# Patient Record
Sex: FEMALE | Race: BLACK OR AFRICAN AMERICAN | Employment: OTHER | URBAN - METROPOLITAN AREA
[De-identification: names, ages, dates, MRNs, and addresses within clinical notes are randomized per-mention and may not be internally consistent; named-entity substitution may affect disease eponyms.]

---

## 2017-04-10 ENCOUNTER — HOSPITAL ENCOUNTER (OUTPATIENT)
Dept: PREADMISSION TESTING | Age: 57
Discharge: HOME | End: 2017-04-10
Payer: SELF-PAY

## 2017-04-10 VITALS
DIASTOLIC BLOOD PRESSURE: 60 MMHG | HEART RATE: 83 BPM | OXYGEN SATURATION: 97 % | TEMPERATURE: 98.4 F | RESPIRATION RATE: 16 BRPM | HEIGHT: 64 IN | SYSTOLIC BLOOD PRESSURE: 122 MMHG | BODY MASS INDEX: 27.64 KG/M2 | WEIGHT: 161.9 LBS

## 2017-04-10 LAB
ANION GAP BLD CALC-SCNC: 9 MMOL/L (ref 5–15)
APTT PPP: 29.6 SEC (ref 22.1–32.5)
ATRIAL RATE: 58 BPM
BASOPHILS # BLD AUTO: 0 K/UL (ref 0–0.1)
BASOPHILS # BLD: 1 % (ref 0–1)
BUN SERPL-MCNC: 15 MG/DL (ref 6–20)
BUN/CREAT SERPL: 17 (ref 12–20)
CALCIUM SERPL-MCNC: 9.3 MG/DL (ref 8.5–10.1)
CALCULATED P AXIS, ECG09: 71 DEGREES
CALCULATED R AXIS, ECG10: 45 DEGREES
CALCULATED T AXIS, ECG11: 57 DEGREES
CHLORIDE SERPL-SCNC: 103 MMOL/L (ref 97–108)
CO2 SERPL-SCNC: 28 MMOL/L (ref 21–32)
CREAT SERPL-MCNC: 0.88 MG/DL (ref 0.55–1.02)
DIAGNOSIS, 93000: NORMAL
EOSINOPHIL # BLD: 0.1 K/UL (ref 0–0.4)
EOSINOPHIL NFR BLD: 2 % (ref 0–7)
ERYTHROCYTE [DISTWIDTH] IN BLOOD BY AUTOMATED COUNT: 12.6 % (ref 11.5–14.5)
GLUCOSE SERPL-MCNC: 82 MG/DL (ref 65–100)
HCT VFR BLD AUTO: 38.3 % (ref 35–47)
HGB BLD-MCNC: 12.1 G/DL (ref 11.5–16)
INR PPP: 1 (ref 0.9–1.1)
LYMPHOCYTES # BLD AUTO: 35 % (ref 12–49)
LYMPHOCYTES # BLD: 1.3 K/UL (ref 0.8–3.5)
MCH RBC QN AUTO: 28.9 PG (ref 26–34)
MCHC RBC AUTO-ENTMCNC: 31.6 G/DL (ref 30–36.5)
MCV RBC AUTO: 91.6 FL (ref 80–99)
MONOCYTES # BLD: 0.3 K/UL (ref 0–1)
MONOCYTES NFR BLD AUTO: 9 % (ref 5–13)
NEUTS SEG # BLD: 2 K/UL (ref 1.8–8)
NEUTS SEG NFR BLD AUTO: 53 % (ref 32–75)
P-R INTERVAL, ECG05: 158 MS
PLATELET # BLD AUTO: 217 K/UL (ref 150–400)
POTASSIUM SERPL-SCNC: 4.8 MMOL/L (ref 3.5–5.1)
PROTHROMBIN TIME: 10.5 SEC (ref 9–11.1)
Q-T INTERVAL, ECG07: 394 MS
QRS DURATION, ECG06: 74 MS
QTC CALCULATION (BEZET), ECG08: 386 MS
RBC # BLD AUTO: 4.18 M/UL (ref 3.8–5.2)
SODIUM SERPL-SCNC: 140 MMOL/L (ref 136–145)
THERAPEUTIC RANGE,PTTT: NORMAL SECS (ref 58–77)
VENTRICULAR RATE, ECG03: 58 BPM
WBC # BLD AUTO: 3.7 K/UL (ref 3.6–11)

## 2017-04-10 PROCEDURE — 85025 COMPLETE CBC W/AUTO DIFF WBC: CPT | Performed by: PLASTIC SURGERY

## 2017-04-10 PROCEDURE — 93005 ELECTROCARDIOGRAM TRACING: CPT

## 2017-04-10 PROCEDURE — 85610 PROTHROMBIN TIME: CPT | Performed by: PLASTIC SURGERY

## 2017-04-10 PROCEDURE — 36415 COLL VENOUS BLD VENIPUNCTURE: CPT | Performed by: PLASTIC SURGERY

## 2017-04-10 PROCEDURE — 85730 THROMBOPLASTIN TIME PARTIAL: CPT | Performed by: PLASTIC SURGERY

## 2017-04-10 PROCEDURE — 80048 BASIC METABOLIC PNL TOTAL CA: CPT | Performed by: PLASTIC SURGERY

## 2017-04-10 RX ORDER — BISMUTH SUBSALICYLATE 262 MG
1 TABLET,CHEWABLE ORAL DAILY
COMMUNITY

## 2017-04-10 RX ORDER — MINERAL OIL
180 ENEMA (ML) RECTAL
COMMUNITY

## 2017-04-10 NOTE — PERIOP NOTES
[facility]  PREOPERATIVE INSTRUCTIONS    Surgery Date:  4/28/2017  Surgery arrival time given by surgeon: NO   If no,[facility] [address] staff will call you between 4 PM- 8 PM the day before surgery with your arrival time. If your surgery is on a Monday, we will call you the preceding Friday. Please call [phone] after 8 PM if you did not receive your arrival time. 1. Please report at the designated time to the 2nd [address] [facility]. Bring your insurance card, photo identification, and any copayment ( if applicable). 2. You must have a responsible adult to drive you home. You need to have a responsible adult to stay with you the first 24 hours after surgery if you are going home the same day of your surgery and you should not drive a car for 24 hours following your surgery. 3. Nothing to eat or drink after midnight the night before surgery. This includes no water, gum, mints, coffee, juice, etc.  Please note special instructions, if applicable, below for medications. 4. MEDICATIONS TO TAKE THE MORNING OF SURGERY WITH A SIP OF WATER: None  5. You MAY take your pain medication the day of surgery with a sip of water. 6. No alcoholic beverages 24 hours before or after your surgery. 7. If you are being admitted to the hospital,please leave personal belongings/luggage in your car until you have an assigned hospital room number. 8. Stop Aspirin and/or any non-steroidal anti-inflammatory drugs (i.e. Ibuprofen, Naproxen, Advil, Aleve) as directed by your prescribing physician. You may take Tylenol. Stop herbal supplements 1 week prior to  surgery. 9. If you are currently taking Plavix, Coumadin,or any other blood-thinning/anticoagulant medication contact your prescribing physician for instructions. 10. Please wear comfortable clothes. Wear your glasses instead of contacts. We ask that all money, jewelry and valuables be left at home. Wear no make up, particularly mascara, the day of surgery.    11.  All body piercings, rings,and jewelry need to be removed and left at home. Please wear your hair loose or down. Please no pony-tails, buns, or any metal hair accessories. If you shower the morning of surgery, please do not apply any lotions, powders, or deodorants afterwards. Do not shave any body area within 24 hours of your surgery. 12. Please follow all instructions to avoid any potential surgical cancellation. 13. Should your physical condition change, (i.e. fever, cold, flu, etc.) please notify your surgeon as soon as possible. 14. It is important to be on time. If a situation occurs where you may be delayed, please call:  [phone]  on the day of surgery. 15. The Preadmission Testing staff can be reached at 21 [phone]. Mirian Livingston 16. Special instructions:   1. Please bring your completed medication sheet with you the day of surgery. Please update any changes in medications. 2. Free  parking  The patient was contacted  in person. She  verbalize  understanding of all instructions does not  need reinforcement.

## 2017-04-27 ENCOUNTER — ANESTHESIA EVENT (OUTPATIENT)
Dept: SURGERY | Age: 57
End: 2017-04-27
Payer: SELF-PAY

## 2017-04-28 ENCOUNTER — ANESTHESIA (OUTPATIENT)
Dept: SURGERY | Age: 57
End: 2017-04-28
Payer: SELF-PAY

## 2017-04-28 ENCOUNTER — HOSPITAL ENCOUNTER (OUTPATIENT)
Age: 57
Setting detail: OBSERVATION
Discharge: HOME | End: 2017-04-29
Attending: PLASTIC SURGERY | Admitting: PLASTIC SURGERY
Payer: SELF-PAY

## 2017-04-28 PROCEDURE — 76060000042 HC ANESTHESIA 5.5 TO 6 HR: Performed by: PLASTIC SURGERY

## 2017-04-28 PROCEDURE — 77030008463 HC STPLR SKN PROX J&J -B: Performed by: PLASTIC SURGERY

## 2017-04-28 PROCEDURE — 77030002996 HC SUT SLK J&J -A: Performed by: PLASTIC SURGERY

## 2017-04-28 PROCEDURE — 77030013629 HC ELECTRD NDL STRY -B: Performed by: PLASTIC SURGERY

## 2017-04-28 PROCEDURE — 74011000258 HC RX REV CODE- 258: Performed by: PLASTIC SURGERY

## 2017-04-28 PROCEDURE — 77030018719 HC DRSG PTCH ANTIMIC J&J -A: Performed by: PLASTIC SURGERY

## 2017-04-28 PROCEDURE — 99218 HC RM OBSERVATION: CPT

## 2017-04-28 PROCEDURE — 77030002966 HC SUT PDS J&J -A: Performed by: PLASTIC SURGERY

## 2017-04-28 PROCEDURE — 77030008467 HC STPLR SKN COVD -B: Performed by: PLASTIC SURGERY

## 2017-04-28 PROCEDURE — 77030034850: Performed by: PLASTIC SURGERY

## 2017-04-28 PROCEDURE — C9290 INJ, BUPIVACAINE LIPOSOME: HCPCS | Performed by: PLASTIC SURGERY

## 2017-04-28 PROCEDURE — 76010000139 HC OR TIME 5.5 TO 6 HR: Performed by: PLASTIC SURGERY

## 2017-04-28 PROCEDURE — 77030002986 HC SUT PROL J&J -A: Performed by: PLASTIC SURGERY

## 2017-04-28 PROCEDURE — 77030002916 HC SUT ETHLN J&J -A: Performed by: PLASTIC SURGERY

## 2017-04-28 PROCEDURE — 77030031139 HC SUT VCRL2 J&J -A: Performed by: PLASTIC SURGERY

## 2017-04-28 PROCEDURE — 76210000016 HC OR PH I REC 1 TO 1.5 HR: Performed by: PLASTIC SURGERY

## 2017-04-28 PROCEDURE — 77030008684 HC TU ET CUF COVD -B: Performed by: ANESTHESIOLOGY

## 2017-04-28 PROCEDURE — 77030018836 HC SOL IRR NACL ICUM -A: Performed by: PLASTIC SURGERY

## 2017-04-28 PROCEDURE — 74011250636 HC RX REV CODE- 250/636: Performed by: ANESTHESIOLOGY

## 2017-04-28 PROCEDURE — 77030020061 HC IV BLD WRMR ADMIN SET 3M -B: Performed by: ANESTHESIOLOGY

## 2017-04-28 PROCEDURE — 77030011244 HC DRN WND HUBLS J&J -B: Performed by: PLASTIC SURGERY

## 2017-04-28 PROCEDURE — 74011250636 HC RX REV CODE- 250/636

## 2017-04-28 PROCEDURE — 77030016570 HC BLNKT BAIR HGGR 3M -B: Performed by: PLASTIC SURGERY

## 2017-04-28 PROCEDURE — 77030019908 HC STETH ESOPH SIMS -A: Performed by: ANESTHESIOLOGY

## 2017-04-28 PROCEDURE — 77030010507 HC ADH SKN DERMBND J&J -B: Performed by: PLASTIC SURGERY

## 2017-04-28 PROCEDURE — 77030013567 HC DRN WND RESERV BARD -A: Performed by: PLASTIC SURGERY

## 2017-04-28 PROCEDURE — 77030032490 HC SLV COMPR SCD KNE COVD -B: Performed by: PLASTIC SURGERY

## 2017-04-28 PROCEDURE — 77030034479 HC ADH SKN CLSR PRINEO J&J -B: Performed by: PLASTIC SURGERY

## 2017-04-28 PROCEDURE — 77030020782 HC GWN BAIR PAWS FLX 3M -B

## 2017-04-28 PROCEDURE — 77030033138 HC SUT PGA STRATFX J&J -B: Performed by: PLASTIC SURGERY

## 2017-04-28 PROCEDURE — 77030008534 HC TBNG LIPOSUC BYRO -B: Performed by: PLASTIC SURGERY

## 2017-04-28 PROCEDURE — 74011250637 HC RX REV CODE- 250/637: Performed by: PLASTIC SURGERY

## 2017-04-28 PROCEDURE — 77030010514 HC APPL CLP LIG COVD -B: Performed by: PLASTIC SURGERY

## 2017-04-28 PROCEDURE — 77030008538 HC TBNG LIPO SUC WELL -B: Performed by: PLASTIC SURGERY

## 2017-04-28 PROCEDURE — 74011000250 HC RX REV CODE- 250

## 2017-04-28 PROCEDURE — 74011000250 HC RX REV CODE- 250: Performed by: PLASTIC SURGERY

## 2017-04-28 PROCEDURE — 74011250636 HC RX REV CODE- 250/636: Performed by: PLASTIC SURGERY

## 2017-04-28 PROCEDURE — 77030002933 HC SUT MCRYL J&J -A: Performed by: PLASTIC SURGERY

## 2017-04-28 PROCEDURE — 77030020255 HC SOL INJ LR 1000ML BG: Performed by: PLASTIC SURGERY

## 2017-04-28 PROCEDURE — 74011000272 HC RX REV CODE- 272: Performed by: PLASTIC SURGERY

## 2017-04-28 PROCEDURE — 77030026438 HC STYL ET INTUB CARD -A: Performed by: ANESTHESIOLOGY

## 2017-04-28 RX ORDER — ROCURONIUM BROMIDE 10 MG/ML
INJECTION, SOLUTION INTRAVENOUS AS NEEDED
Status: DISCONTINUED | OUTPATIENT
Start: 2017-04-28 | End: 2017-04-28 | Stop reason: HOSPADM

## 2017-04-28 RX ORDER — SODIUM CHLORIDE, SODIUM LACTATE, POTASSIUM CHLORIDE, CALCIUM CHLORIDE 600; 310; 30; 20 MG/100ML; MG/100ML; MG/100ML; MG/100ML
100 INJECTION, SOLUTION INTRAVENOUS CONTINUOUS
Status: DISCONTINUED | OUTPATIENT
Start: 2017-04-28 | End: 2017-04-28 | Stop reason: HOSPADM

## 2017-04-28 RX ORDER — HYDROMORPHONE HYDROCHLORIDE 2 MG/1
2 TABLET ORAL
Status: DISCONTINUED | OUTPATIENT
Start: 2017-04-28 | End: 2017-04-29 | Stop reason: HOSPADM

## 2017-04-28 RX ORDER — ONDANSETRON 2 MG/ML
INJECTION INTRAMUSCULAR; INTRAVENOUS AS NEEDED
Status: DISCONTINUED | OUTPATIENT
Start: 2017-04-28 | End: 2017-04-28 | Stop reason: HOSPADM

## 2017-04-28 RX ORDER — MIDAZOLAM HYDROCHLORIDE 1 MG/ML
1 INJECTION, SOLUTION INTRAMUSCULAR; INTRAVENOUS AS NEEDED
Status: DISCONTINUED | OUTPATIENT
Start: 2017-04-28 | End: 2017-04-28 | Stop reason: HOSPADM

## 2017-04-28 RX ORDER — PHENYLEPHRINE HCL IN 0.9% NACL 0.4MG/10ML
SYRINGE (ML) INTRAVENOUS AS NEEDED
Status: DISCONTINUED | OUTPATIENT
Start: 2017-04-28 | End: 2017-04-28 | Stop reason: HOSPADM

## 2017-04-28 RX ORDER — CEFAZOLIN SODIUM IN 0.9 % NACL 2 G/50 ML
2 INTRAVENOUS SOLUTION, PIGGYBACK (ML) INTRAVENOUS EVERY 8 HOURS
Status: COMPLETED | OUTPATIENT
Start: 2017-04-28 | End: 2017-04-29

## 2017-04-28 RX ORDER — DIPHENHYDRAMINE HCL 25 MG
25 CAPSULE ORAL
Status: DISCONTINUED | OUTPATIENT
Start: 2017-04-28 | End: 2017-04-29 | Stop reason: HOSPADM

## 2017-04-28 RX ORDER — SODIUM CHLORIDE, SODIUM LACTATE, POTASSIUM CHLORIDE, CALCIUM CHLORIDE 600; 310; 30; 20 MG/100ML; MG/100ML; MG/100ML; MG/100ML
150 INJECTION, SOLUTION INTRAVENOUS CONTINUOUS
Status: DISCONTINUED | OUTPATIENT
Start: 2017-04-28 | End: 2017-04-28 | Stop reason: HOSPADM

## 2017-04-28 RX ORDER — ACETAMINOPHEN 325 MG/1
650 TABLET ORAL
Status: DISCONTINUED | OUTPATIENT
Start: 2017-04-28 | End: 2017-04-29 | Stop reason: HOSPADM

## 2017-04-28 RX ORDER — ONDANSETRON 2 MG/ML
4 INJECTION INTRAMUSCULAR; INTRAVENOUS AS NEEDED
Status: DISCONTINUED | OUTPATIENT
Start: 2017-04-28 | End: 2017-04-28 | Stop reason: HOSPADM

## 2017-04-28 RX ORDER — NALOXONE HYDROCHLORIDE 0.4 MG/ML
0.4 INJECTION, SOLUTION INTRAMUSCULAR; INTRAVENOUS; SUBCUTANEOUS AS NEEDED
Status: DISCONTINUED | OUTPATIENT
Start: 2017-04-28 | End: 2017-04-29 | Stop reason: HOSPADM

## 2017-04-28 RX ORDER — ENOXAPARIN SODIUM 100 MG/ML
40 INJECTION SUBCUTANEOUS EVERY 24 HOURS
Status: DISCONTINUED | OUTPATIENT
Start: 2017-04-29 | End: 2017-04-29 | Stop reason: HOSPADM

## 2017-04-28 RX ORDER — FENTANYL CITRATE 50 UG/ML
50 INJECTION, SOLUTION INTRAMUSCULAR; INTRAVENOUS AS NEEDED
Status: DISCONTINUED | OUTPATIENT
Start: 2017-04-28 | End: 2017-04-28 | Stop reason: HOSPADM

## 2017-04-28 RX ORDER — HYDROMORPHONE HYDROCHLORIDE 2 MG/ML
2 INJECTION, SOLUTION INTRAMUSCULAR; INTRAVENOUS; SUBCUTANEOUS
Status: DISCONTINUED | OUTPATIENT
Start: 2017-04-28 | End: 2017-04-29 | Stop reason: HOSPADM

## 2017-04-28 RX ORDER — FAMOTIDINE 10 MG/ML
INJECTION INTRAVENOUS AS NEEDED
Status: DISCONTINUED | OUTPATIENT
Start: 2017-04-28 | End: 2017-04-28 | Stop reason: HOSPADM

## 2017-04-28 RX ORDER — CEFAZOLIN SODIUM IN 0.9 % NACL 2 G/50 ML
2 INTRAVENOUS SOLUTION, PIGGYBACK (ML) INTRAVENOUS ONCE
Status: COMPLETED | OUTPATIENT
Start: 2017-04-28 | End: 2017-04-28

## 2017-04-28 RX ORDER — DEXTROSE, SODIUM CHLORIDE, AND POTASSIUM CHLORIDE 5; .45; .15 G/100ML; G/100ML; G/100ML
100 INJECTION INTRAVENOUS CONTINUOUS
Status: DISCONTINUED | OUTPATIENT
Start: 2017-04-28 | End: 2017-04-29 | Stop reason: HOSPADM

## 2017-04-28 RX ORDER — DOCUSATE SODIUM 100 MG/1
100 CAPSULE, LIQUID FILLED ORAL 2 TIMES DAILY
Status: DISCONTINUED | OUTPATIENT
Start: 2017-04-28 | End: 2017-04-29 | Stop reason: HOSPADM

## 2017-04-28 RX ORDER — DIAZEPAM 5 MG/1
5 TABLET ORAL
Status: DISCONTINUED | OUTPATIENT
Start: 2017-04-28 | End: 2017-04-29 | Stop reason: HOSPADM

## 2017-04-28 RX ORDER — NEOSTIGMINE METHYLSULFATE 1 MG/ML
INJECTION INTRAVENOUS AS NEEDED
Status: DISCONTINUED | OUTPATIENT
Start: 2017-04-28 | End: 2017-04-28 | Stop reason: HOSPADM

## 2017-04-28 RX ORDER — MIDAZOLAM HYDROCHLORIDE 1 MG/ML
INJECTION, SOLUTION INTRAMUSCULAR; INTRAVENOUS AS NEEDED
Status: DISCONTINUED | OUTPATIENT
Start: 2017-04-28 | End: 2017-04-28 | Stop reason: HOSPADM

## 2017-04-28 RX ORDER — LIDOCAINE HYDROCHLORIDE 20 MG/ML
INJECTION, SOLUTION EPIDURAL; INFILTRATION; INTRACAUDAL; PERINEURAL AS NEEDED
Status: DISCONTINUED | OUTPATIENT
Start: 2017-04-28 | End: 2017-04-28 | Stop reason: HOSPADM

## 2017-04-28 RX ORDER — DIPHENHYDRAMINE HYDROCHLORIDE 50 MG/ML
12.5 INJECTION, SOLUTION INTRAMUSCULAR; INTRAVENOUS AS NEEDED
Status: DISCONTINUED | OUTPATIENT
Start: 2017-04-28 | End: 2017-04-28 | Stop reason: HOSPADM

## 2017-04-28 RX ORDER — ZOLPIDEM TARTRATE 5 MG/1
5 TABLET ORAL
Status: DISCONTINUED | OUTPATIENT
Start: 2017-04-28 | End: 2017-04-29 | Stop reason: HOSPADM

## 2017-04-28 RX ORDER — SODIUM CHLORIDE 0.9 % (FLUSH) 0.9 %
5-10 SYRINGE (ML) INJECTION EVERY 8 HOURS
Status: DISCONTINUED | OUTPATIENT
Start: 2017-04-28 | End: 2017-04-29 | Stop reason: HOSPADM

## 2017-04-28 RX ORDER — PROPOFOL 10 MG/ML
INJECTION, EMULSION INTRAVENOUS AS NEEDED
Status: DISCONTINUED | OUTPATIENT
Start: 2017-04-28 | End: 2017-04-28 | Stop reason: HOSPADM

## 2017-04-28 RX ORDER — LIDOCAINE HYDROCHLORIDE 10 MG/ML
0.1 INJECTION, SOLUTION EPIDURAL; INFILTRATION; INTRACAUDAL; PERINEURAL AS NEEDED
Status: DISCONTINUED | OUTPATIENT
Start: 2017-04-28 | End: 2017-04-28 | Stop reason: HOSPADM

## 2017-04-28 RX ORDER — ALBUTEROL SULFATE 0.83 MG/ML
2.5 SOLUTION RESPIRATORY (INHALATION) AS NEEDED
Status: DISCONTINUED | OUTPATIENT
Start: 2017-04-28 | End: 2017-04-28 | Stop reason: HOSPADM

## 2017-04-28 RX ORDER — SODIUM CHLORIDE 0.9 % (FLUSH) 0.9 %
5-10 SYRINGE (ML) INJECTION AS NEEDED
Status: DISCONTINUED | OUTPATIENT
Start: 2017-04-28 | End: 2017-04-29 | Stop reason: HOSPADM

## 2017-04-28 RX ORDER — FENTANYL CITRATE 50 UG/ML
INJECTION, SOLUTION INTRAMUSCULAR; INTRAVENOUS AS NEEDED
Status: DISCONTINUED | OUTPATIENT
Start: 2017-04-28 | End: 2017-04-28 | Stop reason: HOSPADM

## 2017-04-28 RX ORDER — HYDROMORPHONE HYDROCHLORIDE 2 MG/1
2 TABLET ORAL
COMMUNITY

## 2017-04-28 RX ORDER — HYDROMORPHONE HYDROCHLORIDE 2 MG/ML
INJECTION, SOLUTION INTRAMUSCULAR; INTRAVENOUS; SUBCUTANEOUS AS NEEDED
Status: DISCONTINUED | OUTPATIENT
Start: 2017-04-28 | End: 2017-04-28 | Stop reason: HOSPADM

## 2017-04-28 RX ORDER — GLYCOPYRROLATE 0.2 MG/ML
INJECTION INTRAMUSCULAR; INTRAVENOUS AS NEEDED
Status: DISCONTINUED | OUTPATIENT
Start: 2017-04-28 | End: 2017-04-28 | Stop reason: HOSPADM

## 2017-04-28 RX ORDER — ONDANSETRON 2 MG/ML
4 INJECTION INTRAMUSCULAR; INTRAVENOUS
Status: DISCONTINUED | OUTPATIENT
Start: 2017-04-28 | End: 2017-04-29 | Stop reason: HOSPADM

## 2017-04-28 RX ORDER — LORAZEPAM 2 MG/ML
1 INJECTION INTRAMUSCULAR
Status: DISCONTINUED | OUTPATIENT
Start: 2017-04-28 | End: 2017-04-29 | Stop reason: HOSPADM

## 2017-04-28 RX ORDER — DEXAMETHASONE SODIUM PHOSPHATE 4 MG/ML
INJECTION, SOLUTION INTRA-ARTICULAR; INTRALESIONAL; INTRAMUSCULAR; INTRAVENOUS; SOFT TISSUE AS NEEDED
Status: DISCONTINUED | OUTPATIENT
Start: 2017-04-28 | End: 2017-04-28 | Stop reason: HOSPADM

## 2017-04-28 RX ORDER — SUCCINYLCHOLINE CHLORIDE 20 MG/ML
INJECTION INTRAMUSCULAR; INTRAVENOUS AS NEEDED
Status: DISCONTINUED | OUTPATIENT
Start: 2017-04-28 | End: 2017-04-28 | Stop reason: HOSPADM

## 2017-04-28 RX ORDER — DIAZEPAM 5 MG/1
5 TABLET ORAL
COMMUNITY

## 2017-04-28 RX ORDER — HYDROMORPHONE HYDROCHLORIDE 1 MG/ML
1 INJECTION, SOLUTION INTRAMUSCULAR; INTRAVENOUS; SUBCUTANEOUS
Status: DISCONTINUED | OUTPATIENT
Start: 2017-04-28 | End: 2017-04-28 | Stop reason: HOSPADM

## 2017-04-28 RX ORDER — DIAZEPAM 10 MG/1
5 TABLET ORAL
Status: ON HOLD | COMMUNITY
End: 2017-04-28

## 2017-04-28 RX ADMIN — GLYCOPYRROLATE 0.6 MG: 0.2 INJECTION INTRAMUSCULAR; INTRAVENOUS at 13:42

## 2017-04-28 RX ADMIN — HYDROMORPHONE HYDROCHLORIDE 0.5 MG: 2 INJECTION, SOLUTION INTRAMUSCULAR; INTRAVENOUS; SUBCUTANEOUS at 10:55

## 2017-04-28 RX ADMIN — Medication 10 ML: at 16:20

## 2017-04-28 RX ADMIN — LIDOCAINE HYDROCHLORIDE 60 MG: 20 INJECTION, SOLUTION EPIDURAL; INFILTRATION; INTRACAUDAL; PERINEURAL at 08:19

## 2017-04-28 RX ADMIN — Medication 10 ML: at 19:46

## 2017-04-28 RX ADMIN — DOCUSATE SODIUM 100 MG: 100 CAPSULE ORAL at 16:20

## 2017-04-28 RX ADMIN — PROPOFOL 200 MG: 10 INJECTION, EMULSION INTRAVENOUS at 08:20

## 2017-04-28 RX ADMIN — DEXAMETHASONE SODIUM PHOSPHATE 8 MG: 4 INJECTION, SOLUTION INTRA-ARTICULAR; INTRALESIONAL; INTRAMUSCULAR; INTRAVENOUS; SOFT TISSUE at 08:28

## 2017-04-28 RX ADMIN — NEOSTIGMINE METHYLSULFATE 3 MG: 1 INJECTION INTRAVENOUS at 13:42

## 2017-04-28 RX ADMIN — ROCURONIUM BROMIDE 5 MG: 10 INJECTION, SOLUTION INTRAVENOUS at 12:38

## 2017-04-28 RX ADMIN — SUCCINYLCHOLINE CHLORIDE 100 MG: 20 INJECTION INTRAMUSCULAR; INTRAVENOUS at 08:21

## 2017-04-28 RX ADMIN — HYDROMORPHONE HYDROCHLORIDE 2 MG: 2 INJECTION INTRAMUSCULAR; INTRAVENOUS; SUBCUTANEOUS at 19:45

## 2017-04-28 RX ADMIN — FENTANYL CITRATE 100 MCG: 50 INJECTION, SOLUTION INTRAMUSCULAR; INTRAVENOUS at 08:19

## 2017-04-28 RX ADMIN — CEFAZOLIN 2 G: 1 INJECTION, POWDER, FOR SOLUTION INTRAMUSCULAR; INTRAVENOUS; PARENTERAL at 08:14

## 2017-04-28 RX ADMIN — HYDROMORPHONE HYDROCHLORIDE 0.5 MG: 2 INJECTION, SOLUTION INTRAMUSCULAR; INTRAVENOUS; SUBCUTANEOUS at 09:06

## 2017-04-28 RX ADMIN — SODIUM CHLORIDE, SODIUM LACTATE, POTASSIUM CHLORIDE, AND CALCIUM CHLORIDE 150 ML/HR: 600; 310; 30; 20 INJECTION, SOLUTION INTRAVENOUS at 06:36

## 2017-04-28 RX ADMIN — Medication 80 MCG: at 12:49

## 2017-04-28 RX ADMIN — DEXTROSE MONOHYDRATE, SODIUM CHLORIDE, AND POTASSIUM CHLORIDE 100 ML/HR: 50; 4.5; 1.49 INJECTION, SOLUTION INTRAVENOUS at 14:34

## 2017-04-28 RX ADMIN — HYDROMORPHONE HYDROCHLORIDE 2 MG: 2 TABLET ORAL at 23:43

## 2017-04-28 RX ADMIN — CEFAZOLIN 2 G: 1 INJECTION, POWDER, FOR SOLUTION INTRAMUSCULAR; INTRAVENOUS; PARENTERAL at 16:19

## 2017-04-28 RX ADMIN — ROCURONIUM BROMIDE 15 MG: 10 INJECTION, SOLUTION INTRAVENOUS at 10:39

## 2017-04-28 RX ADMIN — ONDANSETRON 4 MG: 2 INJECTION INTRAMUSCULAR; INTRAVENOUS at 08:28

## 2017-04-28 RX ADMIN — ONDANSETRON 4 MG: 2 INJECTION INTRAMUSCULAR; INTRAVENOUS at 12:52

## 2017-04-28 RX ADMIN — ROCURONIUM BROMIDE 5 MG: 10 INJECTION, SOLUTION INTRAVENOUS at 08:20

## 2017-04-28 RX ADMIN — MIDAZOLAM HYDROCHLORIDE 2 MG: 1 INJECTION, SOLUTION INTRAMUSCULAR; INTRAVENOUS at 08:13

## 2017-04-28 RX ADMIN — FAMOTIDINE 20 MG: 10 INJECTION INTRAVENOUS at 08:13

## 2017-04-28 RX ADMIN — CEFAZOLIN 2 G: 1 INJECTION, POWDER, FOR SOLUTION INTRAMUSCULAR; INTRAVENOUS; PARENTERAL at 23:41

## 2017-04-28 RX ADMIN — ROCURONIUM BROMIDE 40 MG: 10 INJECTION, SOLUTION INTRAVENOUS at 08:28

## 2017-04-28 NOTE — PERIOP NOTES
2365 - Patient family updated on surgical progress and patient well being. 1207 - Patient family updated on surgical progress and patient well being.

## 2017-04-28 NOTE — H&P
History and Physical    Patient is a 64 y.o. well-developed, well nourished female who presents for revision of abdominal scar and abdominoplasty with flank liposuction. History reviewed. No pertinent past medical history. Past Surgical History:   Procedure Laterality Date    HX GYN  1977    ovarian cyst     HX HEENT      dental surgery (implants) [phone]    HX ORTHOPAEDIC      ORIF left ankle with plates    HX TUBAL LIGATION  1993     Prior to Admission medications    Medication Sig Start Date End Date Taking? Authorizing Provider   HYDROmorphone (DILAUDID) 2 mg tablet Take 2 mg by mouth every four (4) hours as needed for Pain. Yes Historical Provider   diazePAM (VALIUM) 5 mg tablet Take 5 mg by mouth every six (6) hours as needed for Anxiety. Yes Historical Provider   multivitamin (ONE A DAY) tablet Take 1 Tab by mouth daily. Historical Provider   fexofenadine (ALLEGRA) 180 mg tablet Take 180 mg by mouth daily as needed for Allergies. Historical Provider     No Known Allergies    General:   No apparent distress. Heart:  Regular rate and rhythm without murmurs or rubs. Lungs:  Clear to ausculation bilaterally; no wheezes, rales or rhonchi. Patient is ready to go to surgery.     Kalpana Uribe MD  4/28/2017

## 2017-04-28 NOTE — PERIOP NOTES
Pt reports taking valium (unknow dose) and another rx for pain/muscle relaxant (unknown name) this am 0400 with a small sip of water. Pt reports  Dr Heike Og prescribes both of these  Medications.

## 2017-04-28 NOTE — ANESTHESIA POSTPROCEDURE EVALUATION
Post-Anesthesia Evaluation and Assessment    Patient: Akash Kim MRN: [id number]  SSN: [id number]    YOB: 1960  Age: 64 y.o. Sex: female       Cardiovascular Function/Vital Signs  Visit Vitals    /63    Pulse 89    Temp 36.7 °C (98.1 °F)    Resp 20    Ht 5' 4\" (1.626 m)    Wt 72.6 kg (160 lb 0.9 oz)    SpO2 100%    BMI 27.47 kg/m2       Patient is status post general anesthesia for Procedure(s):  ABDOMINOPLASTY WITH FLANK LIPOSUCTION. Nausea/Vomiting: None    Postoperative hydration reviewed and adequate. Pain:  Pain Scale 1: Visual (04/28/17 1415)  Pain Intensity 1: 0 (04/28/17 1415)   Managed    Neurological Status:   Neuro (WDL): Exceptions to WDL (04/28/17 1415)  Neuro  Neurologic State: Anesthetized (post op) (04/28/17 1415)   At baseline    Mental Status and Level of Consciousness: Arousable    Pulmonary Status:   O2 Device: Nasal cannula (04/28/17 1417)   Adequate oxygenation and airway patent    Complications related to anesthesia: None    Post-anesthesia assessment completed.  No concerns    Signed By: Joey Moreno DO     April 28, 2017

## 2017-04-28 NOTE — ROUTINE PROCESS
TRANSFER - OUT REPORT:    Verbal report given to Josephine CARSUO on Che Cardoza  being transferred to [address] [place] [address] for routine post - op       Report consisted of patients Situation, Background, Assessment and   Recommendations(SBAR). Information from the following report(s) SBAR and OR Summary was reviewed with the receiving nurse. Lines:   Peripheral IV 04/28/17 Right Antecubital (Active)   Site Assessment Clean, dry, & intact 4/28/2017  3:38 PM   Phlebitis Assessment 0 4/28/2017  3:38 PM   Infiltration Assessment 0 4/28/2017  3:38 PM   Dressing Status Clean, dry, & intact 4/28/2017  3:38 PM   Dressing Type Transparent 4/28/2017  3:38 PM   Hub Color/Line Status Pink 4/28/2017  3:38 PM   Alcohol Cap Used Yes 4/28/2017  6:15 AM        Opportunity for questions and clarification was provided.       Patient transported with:   Registered Nurse

## 2017-04-28 NOTE — ANESTHESIA PREPROCEDURE EVALUATION
Anesthetic History   No history of anesthetic complications            Review of Systems / Medical History  Patient summary reviewed, nursing notes reviewed and pertinent labs reviewed    Pulmonary  Within defined limits                 Neuro/Psych              Cardiovascular  Within defined limits                Exercise tolerance: >4 METS     GI/Hepatic/Renal  Within defined limits              Endo/Other  Within defined limits           Other Findings              Physical Exam    Airway  Mallampati: II    Neck ROM: normal range of motion   Mouth opening: Normal     Cardiovascular    Rhythm: regular  Rate: normal         Dental    Dentition: Implants     Pulmonary  Breath sounds clear to auscultation               Abdominal         Other Findings            Anesthetic Plan    ASA: 2  Anesthesia type: general          Induction: Intravenous  Anesthetic plan and risks discussed with: Patient and Sibling      Informed consent obtained.

## 2017-04-28 NOTE — IP AVS SNAPSHOT
[address] 
[phone] Patient: Che Cardoza MRN: [id number] MUX:[id number] You are allergic to the following No active allergies Recent Documentation Height Weight BMI OB Status Smoking Status 1.626 m 72.6 kg 27.47 kg/m2 Menopause Never Smoker Emergency Contacts Name Discharge Info Relation Home Work Mobile Sanaz Manrique YES [1] Daughter [21] [phone] About your hospitalization You were admitted on:  April 28, 2017 You last received care in the:  [facility] 4M POST SURG ORT 2 You were discharged on:  April 29, 2017 Unit phone number:  [phone] Why you were hospitalized Your primary diagnosis was:  Not on File Providers Seen During Your Hospitalizations Provider Role Specialty Primary office phone Guy Saleh MD Attending Provider Plastic Surgery [phone] Your Primary Care Physician (PCP) Primary Care Physician Office Phone Office Fax Emily Katie [phone] Follow-up Information Follow up With Details Comments Contact Info Verito Mendoza MD   [address] [address] 
[phone] Guy Saleh MD In 4 days  [place] [address] 
[address] [place] [address] 
[phone] Current Discharge Medication List  
  
CONTINUE these medications which have NOT CHANGED Dose & Instructions Dispensing Information Comments Morning Noon Evening Bedtime  
 diazePAM 5 mg tablet Commonly known as:  VALIUM Your last dose was: Your next dose is:    
   
   
 Dose:  5 mg Take 5 mg by mouth every six (6) hours as needed for Anxiety. Refills:  0  
     
   
   
   
  
 fexofenadine 180 mg tablet Commonly known as:  Kianna Fears Your last dose was:     
   
Your next dose is:    
   
   
 Dose:  180 mg  
 Take 180 mg by mouth daily as needed for Allergies. Refills:  0 HYDROmorphone 2 mg tablet Commonly known as:  DILAUDID Your last dose was: Your next dose is:    
   
   
 Dose:  2 mg Take 2 mg by mouth every four (4) hours as needed for Pain. Refills:  0  
     
   
   
   
  
 multivitamin tablet Commonly known as:  ONE A DAY Your last dose was: Your next dose is:    
   
   
 Dose:  1 Tab Take 1 Tab by mouth daily. Refills:  0 Discharge Instructions Keep surgical incision tapes in place. Do not apply lotions or ointments to incisions. Follow drain care instructions and bring log to follow up. May shower tomorrow. Do not allow drains to hang or dangle while showering. May remove surgical garment to shower or launder, as needed. Avoid strenuous activity, pushing, pulling, or heavy lifting. Sleep on back only with head elevated on 3-4 pillows. Discharge Orders None Introducing [facility]! [employer] introduces Overblog patient portal. Now you can access parts of your medical record, email your doctor's office, and request medication refills online. 1. In your internet browser, go to [email or web address] 2. Click on the First Time User? Click Here link in the Sign In box. You will see the New Member Sign Up page. 3. Enter your Overblog Access Code exactly as it appears below. You will not need to use this code after youve completed the sign-up process. If you do not sign up before the expiration date, you must request a new code. · Overblog Access Code: [email or web address] Expires: 6/1/2017 12:49 PM 
 
4. Enter the last four digits of your Social Security Number (xxxx) and Date of Birth (mm/dd/yyyy) as indicated and click Submit. You will be taken to the next sign-up page. 5. Create a Overblog ID.  This will be your Overblog login ID and cannot be changed, so think of one that is secure and easy to remember. 6. Create a SimpleDeal password. You can change your password at any time. 7. Enter your Password Reset Question and Answer. This can be used at a later time if you forget your password. 8. Enter your e-mail address. You will receive e-mail notification when new information is available in [address] 9. Click Sign Up. You can now view and download portions of your medical record. 10. Click the Download Summary menu link to download a portable copy of your medical information. If you have questions, please visit the Frequently Asked Questions section of the SimpleDeal website. Remember, SimpleDeal is NOT to be used for urgent needs. For medical emergencies, dial 911. Now available from your iPhone and Android! General Information Please provide this summary of care documentation to your next provider. Patient Signature:  ____________________________________________________________ Date:  ____________________________________________________________  
  
Gordon Mcclendon Provider Signature:  ____________________________________________________________ Date:  ____________________________________________________________

## 2017-04-28 NOTE — BRIEF OP NOTE
BRIEF OPERATIVE NOTE    Date of Procedure: 4/28/2017   Preoperative Diagnosis: COSMETIC  Postoperative Diagnosis: COSMETIC    Procedure(s):  ABDOMINOPLASTY WITH FLANK LIPOSUCTION  Surgeon(s) and Role:     * Calli Cuello MD - Primary         Assistant Staff:       Surgical Staff:  Circ-1: Nate Mcgill RN  Circ-Relief: Patrick Gonzalez RN  Scrub Tech-1: Kadi Green  Scrub RN-Relief: Arik Smallwood RN  Surg Asst-1: Evelyne Pretty  Event Time In   Incision Start 7647   Incision Close      Anesthesia: General   Estimated Blood Loss: 150  Specimens:   ID Type Source Tests Collected by Time Destination   1 : abdominal skin and fat Skin Abdomen  Calli Cuello MD 4/28/2017 1347 Discarded      Findings: abdominal scar with atrophic skin and liposis of the flanks.   Complications: None  Implants: * No implants in log *

## 2017-04-29 VITALS
RESPIRATION RATE: 18 BRPM | TEMPERATURE: 98.2 F | BODY MASS INDEX: 27.33 KG/M2 | HEART RATE: 83 BPM | DIASTOLIC BLOOD PRESSURE: 70 MMHG | OXYGEN SATURATION: 98 % | SYSTOLIC BLOOD PRESSURE: 137 MMHG | WEIGHT: 160.05 LBS | HEIGHT: 64 IN

## 2017-04-29 PROCEDURE — 99218 HC RM OBSERVATION: CPT

## 2017-04-29 PROCEDURE — 77030036554

## 2017-04-29 PROCEDURE — 74011250637 HC RX REV CODE- 250/637: Performed by: PLASTIC SURGERY

## 2017-04-29 PROCEDURE — 74011250636 HC RX REV CODE- 250/636: Performed by: PLASTIC SURGERY

## 2017-04-29 RX ADMIN — DEXTROSE MONOHYDRATE, SODIUM CHLORIDE, AND POTASSIUM CHLORIDE 100 ML/HR: 50; 4.5; 1.49 INJECTION, SOLUTION INTRAVENOUS at 03:49

## 2017-04-29 RX ADMIN — ACETAMINOPHEN 650 MG: 325 TABLET ORAL at 08:04

## 2017-04-29 RX ADMIN — HYDROMORPHONE HYDROCHLORIDE 2 MG: 2 TABLET ORAL at 08:10

## 2017-04-29 RX ADMIN — ENOXAPARIN SODIUM 40 MG: 40 INJECTION SUBCUTANEOUS at 06:22

## 2017-04-29 RX ADMIN — Medication 10 ML: at 06:23

## 2017-04-29 RX ADMIN — HYDROMORPHONE HYDROCHLORIDE 2 MG: 2 TABLET ORAL at 11:31

## 2017-04-29 RX ADMIN — HYDROMORPHONE HYDROCHLORIDE 2 MG: 2 TABLET ORAL at 04:08

## 2017-04-29 RX ADMIN — DOCUSATE SODIUM 100 MG: 100 CAPSULE ORAL at 08:04

## 2017-04-29 NOTE — PROGRESS NOTES
Procedure:  Abdominoplasty/ flank liposuction    Subjective:  No complaints. Objective:  Visit Vitals    /70 (BP 1 Location: Left arm, BP Patient Position: At rest)    Pulse 83    Temp 98.2 °F (36.8 °C)    Resp 18    Ht 5' 4\" (1.626 m)    Wt 72.6 kg (160 lb 0.9 oz)    SpO2 98%    BMI 27.47 kg/m2       Intake/Output Summary (Last 24 hours) at 04/29/17 1010  Last data filed at 04/29/17 3932   Gross per 24 hour   Intake             3485 ml   Output             3362 ml   Net              123 ml         Physical Exam:  Abdomen- soft, incisions in tact. Umbilicus pink. Assessment   Looks good.   Skin flaps well perfused      Plan    Diet as tolerated  oob  D/c hsu  Drain care instructions  D/c later today

## 2017-04-29 NOTE — DISCHARGE INSTRUCTIONS
Keep surgical incision tapes in place. Do not apply lotions or ointments to incisions. Follow drain care instructions and bring log to follow up. May shower tomorrow. Do not allow drains to hang or dangle while showering. May remove surgical garment to shower or launder, as needed. Avoid strenuous activity, pushing, pulling, or heavy lifting. Sleep on back only with head elevated on 3-4 pillows.

## 2017-04-29 NOTE — PROGRESS NOTES
Bedside shift change report given to Madelyn Baldwin RN (oncoming nurse) by Cam Mobley RN (offgoing nurse). Report included the following information SBAR, Kardex, Procedure Summary, Intake/Output, MAR and Recent Results.

## 2017-05-01 NOTE — OP NOTES
Date of Procedure: 4/28/2017   Preoperative Diagnosis: COSMETIC  Postoperative Diagnosis: COSMETIC   Procedure(s):  ABDOMINOPLASTY WITH FLANK LIPOSUCTION  VENTRAL HERNIA REPAIR  Surgeon(s) and Role:  * Kiya Clements MD - Primary      Assistant Staff:         Surgical Staff:  Circ-1: Caitlin Dias RN  Circ-Relief: Neri Hamilton RN  Scrub Tech-1: Sergio Lord  Scrub RN-Relief: Jody Santos RN  Surg Asst-1: Rachana Madisonjaime    Anesthesia: General   Estimated Blood Loss: 150  Specimens:   ID Type Source Tests Collected by Time Destination   1 : abdominal skin and fat Skin Abdomen   Kiya Clements MD 4/28/2017 1347 Discarded       Findings: abdominal scar with atrophic skin and liposis of the flanks. Complications: None  Implants: * No implants in log *    Description  After informed consent was obtained, the patient  underwent presurgical markings in the standing position in her same-day  surgery suite. She was marked with a standard abdominoplasty and bilateral flank liposuction. There was a widened vertical lower midline scar of the infraumbilical abdomen. The patient was placed in the supine position. After adequate general anesthesia was induced, the trunk was widely prepped and draped in the usual sterile fashion and placed on sterile underlying drapes to maintain a sterile field while changing position. The patient was then placed in the left lateral decubitus position to expose the right flank. Access incisions for suction-assisted lipectomy were designed and marked in the planned areas of treatment. Using a tumescent infiltration cannula, a tumescent wetting  solution was infiltrated via a Klein pump. Wetting was performed until adequate  skin turgor was obtained. Upon completion of the infiltration, suction-assisted  lipectomy was then performed through the various access incisions previously  made.   Suctioning was performed in a radial fashion with cross suctioning  performed through the various access incisions to provide even contour. Upon completion of the right flank, the patient was repositioned in the right lateral decubitus position to expose the left flank. Suction assisted lipectomy of the left flank was performed in identical fashion. Liposuctioning of the deep fatty layers were performed with a 4-mm Tri-port cannula. Suctioning of the superficial fatty layers were performed with  3-mm Tri-Port cannula. Proper cannula depth was determined by a gentle palpation of  the overlying skin. Suctioning was continued until even contours were obtained  with palpation by skin pinching. A total of 700 mL and 675mL of tumescent fluid was  infiltrated to the right and left flanks, respectively. A total of 700 and 800 mL of lipoaspirate was obtained from the right and left, respectively. Upon completion of the liposuction, the access ports were closed with interrupted 5-0 Prolene simple sutures. The patient was then repositioned in the supine position, and the abdomen was widely prepped and draped in the usual sterile fashion. A 2-0 silk suture was placed at  the superior umbilical castanon and at pubis and used as a triangulation suture  to ensure symmetry of the incision. Sutures were then removed and attention  made to the planned incision. The incision was sharply made and deepened with electrocautery. Dissection  was extended with electrocautery down to the fascia of the anterior abdominal wall. With  gentle upward retraction, a skin flap was elevated off of the anterior  abdominal wall in a caudal to cranial direction. The vertical scar was carefully elevated off of the lower abdominal wall with electrocautery. At the level of the umbilicus, attention was  made to the umbilicus. With gentle upward traction on the umbilicus, a  periumbilical incision was designed and marked.  The incisions were made and  the umbilicus on its attached stalk was carefully dissected from its  position in the surrounding tissue. The patient was then placed in approximately 30 degrees of flexion and it  was found skin flaps would easily transpose to meet the lower abdominal  skin incision. The patient was placed in the supine position. The flap was then bisected along  the midline. Skin flap elevation was then continued around the  umbilicus in a caudal to cranial direction to the level of the xiphoid. The  skin flap was elevated off to adequately expose the rectus muscles while  maintaining intercostal perforators. A small supra-umbilical hernia was identified with a small lipoma in the hernia sack. The sack was incised and the lipoma excised with electrocautery. The borders of the hernia were defined with electrocautery, and the hernia was repaired with 0 Prolene figure of 8 sutures. It was clear that there was a  supra and infraumbilical rectus diastasis of approximately 2 cm. The  wound was irrigated with copious amounts of a bacitracin, gentamicin and  vancomycin solution. At this point, attention was made to the rectus diastasis. A rectus  plication was then marked, superior and inferior to the umbilicus. The  plication was performed in 2 layers. The initial repair was done with  interrupted 0 Prolene figure-of-eight sutures. A running imbricating #1 Prolene  was then used to reinforce the repair. The running suture was run from the  xiphoid to the umbilicus and then from the pubis to the umbilicus. To assist with postoperative pain control, EXPAREL was brought to the field  and diluted according to 's specification. Multisegmented  intercostal nerve blocks were then performed using the EXPAREL. It was  placed along the intercostal spaces as well as along the lengths of the  repair. A total of 20 mL EXPAREL diluted in 20 mL of injectable saline was  used. The patient was then placed approximately 20 degrees of flexion.  The  superior skin flaps were then transposed to meet the lower transverse  abdominal incision. Excess lower abdominal skin was then marked for  excision. The incisions were made sharply and extended with electrocautery. The excess abdominal skin was then passed off the field. The weight of the  left hemiflap was 338 grams, the right was 432 grams. Through gentle palpation through the superior skin flap, a new umbilical  aperture was designed and marked along the midline. Skin incisions were  made and carried down to subcutaneous tissue. The new umbilical aperture  was then completed with electrocautery. The umbilicus on its attached stalk  was then transposed through the new umbilical aperture and inset with  interrupted 3-0 Monocryl sutures in the deep dermis. Charly drains of  19-Irish were placed and brought out through separate stab incisions in  the skin. The incisions were then closed in multiple layers. The Jose Juans  fascia was reapproximated with interrupted 0 PDS sutures. The skin closed  with interrupted 2-0 Monocryl sutures in the subcutaneous and deep dermal  tissues, followed by running 2-0 Monocryl Stratafix suture. The skin edges  were reapproximated with a Prineo wound closure device. The periumbilical  incision was closed with interrupted 3-0 Monocryl sutures in the deep  dermis and a running 4-0 Monocryl subcuticular suture. Dermabond was then  applied. The patient was placed and abdominal binder and then extubated in  the operating room in the sitting beach chair position. She was then  transferred to her hospital bed.

## 2024-12-27 ENCOUNTER — TRANSCRIBE ORDERS (OUTPATIENT)
Facility: HOSPITAL | Age: 64
End: 2024-12-27

## 2024-12-27 DIAGNOSIS — Z87.09 HX OF CHRONIC SINUSITIS: ICD-10-CM

## 2024-12-27 DIAGNOSIS — J34.89 SINUS PRESSURE: Primary | ICD-10-CM

## 2024-12-27 DIAGNOSIS — R51.9 NONINTRACTABLE HEADACHE, UNSPECIFIED CHRONICITY PATTERN, UNSPECIFIED HEADACHE TYPE: ICD-10-CM

## 2025-01-06 ENCOUNTER — HOSPITAL ENCOUNTER (OUTPATIENT)
Facility: HOSPITAL | Age: 65
Discharge: HOME | End: 2025-01-09
Payer: OTHER GOVERNMENT

## 2025-01-06 DIAGNOSIS — J34.89 SINUS PRESSURE: ICD-10-CM

## 2025-01-06 DIAGNOSIS — Z87.09 HX OF CHRONIC SINUSITIS: ICD-10-CM

## 2025-01-06 DIAGNOSIS — R51.9 NONINTRACTABLE HEADACHE, UNSPECIFIED CHRONICITY PATTERN, UNSPECIFIED HEADACHE TYPE: ICD-10-CM

## 2025-01-06 PROCEDURE — 70486 CT MAXILLOFACIAL W/O DYE: CPT

## (undated) DEVICE — SOLUTION LACTATED RINGERS INJECTION USP

## (undated) DEVICE — DEVON™ KNEE AND BODY STRAP 60" X 3" (1.5 M X 7.6 CM): Brand: DEVON

## (undated) DEVICE — Device

## (undated) DEVICE — SPONGE LAP 18X18IN STRL -- 5/PK

## (undated) DEVICE — NON-WOVEN SPONGES,4 PLY: Brand: DERMACEA

## (undated) DEVICE — BULB SYRINGE, IRRIGATION WITH PROTECTIVE CAP, 60 CC, INDIVIDUALLY WRAPPED: Brand: DOVER

## (undated) DEVICE — DRAIN SURG 19FR SIL RND HUBLESS W/ 0.25IN BEND TRCR BLAK

## (undated) DEVICE — SUT PROL 1 30IN CT1 BLU --

## (undated) DEVICE — STERILE POLYISOPRENE POWDER-FREE SURGICAL GLOVES: Brand: PROTEXIS

## (undated) DEVICE — SUT SLK 2-0SH 30IN BLK --

## (undated) DEVICE — SUTURE MCRYL SZ 2-0 L27IN ABSRB UD SH L26MM TAPERPOINT NDL Y417H

## (undated) DEVICE — TOWEL SURG W17XL27IN STD BLU COT NONFENESTRATED PREWASHED

## (undated) DEVICE — SUTURE ETHLN SZ 2-0 L18IN NONABSORBABLE BLK L19MM PS-2 PRIM 593H

## (undated) DEVICE — DERMABOND SKIN ADH 0.7ML -- DERMABOND ADVANCED 12/BX

## (undated) DEVICE — ELECTRODE NDL L2CM TUNGSTEN MICSURG STR IMPROVED CUT AND

## (undated) DEVICE — MICRODISSECTION NEEDLE STRAIGHT SLEEVE: Brand: COLORADO

## (undated) DEVICE — Z CONVERTED USE 2271043 CONTAINER SPEC COLL 4OZ SCR ON LID PEEL PCH

## (undated) DEVICE — GAUZE SPONGES,12 PLY: Brand: CURITY

## (undated) DEVICE — SUTURE PROL SZ 5-0 L18IN NONABSORBABLE BLU L16MM PC-3 3/8 8635G

## (undated) DEVICE — SUTURE PDS II SZ 0 L36IN ABSRB VLT L40MM CT 1/2 CIR Z358T

## (undated) DEVICE — PACK,ORTOHMAX/CVMAX,UNIVERSAL,5/CS: Brand: MEDLINE

## (undated) DEVICE — MEDI-VAC NON-CONDUCTIVE SUCTION TUBING: Brand: CARDINAL HEALTH

## (undated) DEVICE — STAPLER SKIN H3.9MM WIRE DIA0.58MM CRWN 6.9MM 35 STPL ROT

## (undated) DEVICE — DRSG PATCH ANTIMIC 1INX4.0MM -- CONVERT TO ITEM 356053

## (undated) DEVICE — 1200 GUARD II KIT W/5MM TUBE W/O VAC TUBE: Brand: GUARDIAN

## (undated) DEVICE — DRAPE,REIN 53X77,STERILE: Brand: MEDLINE

## (undated) DEVICE — SKIN MARKER,REGULAR TIP WITH RULER AND LABELS: Brand: DEVON

## (undated) DEVICE — 3M™ TEGADERM™ TRANSPARENT FILM DRESSING FRAME STYLE, 1624W, 2-3/8 IN X 2-3/4 IN (6 CM X 7 CM), 100/CT 4CT/CASE: Brand: 3M™ TEGADERM™

## (undated) DEVICE — STAPLER SKIN 35CT WD STRL DISP -- MULTIFIRE PREMIUM

## (undated) DEVICE — CONVERTORS STOCKINETTE: Brand: CONVERTORS

## (undated) DEVICE — SUTURE MCRYL SZ 3-0 L27IN ABSRB UD L19MM PS-2 3/8 CIR PRIM Y427H

## (undated) DEVICE — SUTURE PDS II SZ 2-0 L36IN ABSRB VLT CT L40MM 1/2 CIR TAPR Z357H

## (undated) DEVICE — Z CONVERTED USE 2271116 TUBING ASPIR 9 FT STRL ULTRA-LIMP

## (undated) DEVICE — 3000CC GUARDIAN II: Brand: GUARDIAN

## (undated) DEVICE — NEEDLE HYPO 22GA L1.5IN BLK S STL HUB POLYPR SHLD REG BVL

## (undated) DEVICE — SYSTEM SKIN CLSR 22CM DERMBND PRINEO

## (undated) DEVICE — SUTURE STRATAFIX SPRL MCRYL + SZ 3-0 L24IN ABSRB UD PS-2 SXMP1B108

## (undated) DEVICE — (D)SYR 10ML 1/5ML GRAD NSAF -- PKGING CHANGE USE ITEM 338027

## (undated) DEVICE — TRAY CATH OD16FR SIL URIN M STATLOK STBL DEV SURSTP

## (undated) DEVICE — KENDALL SCD EXPRESS SLEEVES, KNEE LENGTH, MEDIUM: Brand: KENDALL SCD

## (undated) DEVICE — SUTURE PROL SZ 6-0 L18IN NONABSORBABLE BLU P-3 L13MM 3/8 8695G

## (undated) DEVICE — 3M™ TEGADERM™ TRANSPARENT FILM DRESSING FRAME STYLE, 1626W, 4 IN X 4-3/4 IN (10 CM X 12 CM), 50/CT 4CT/CASE: Brand: 3M™ TEGADERM™

## (undated) DEVICE — Z DISCONTINUED PER MEDLINE PACK PROCEDURE SURG PLAS

## (undated) DEVICE — NDL CTR 10/20 DBL MAGS PK: Brand: CARDINAL HEALTH

## (undated) DEVICE — SOLUTION IV 1000ML 0.9% SOD CHL

## (undated) DEVICE — APPLIER LIG CLP 115IN AUTO W SUP INTLOK 30 M TI CLP PREM

## (undated) DEVICE — INTENDED FOR TISSUE SEPARATION, AND OTHER PROCEDURES THAT REQUIRE A SHARP SURGICAL BLADE TO PUNCTURE OR CUT.: Brand: BARD-PARKER ® CARBON RIB-BACK BLADES

## (undated) DEVICE — (D)PREP SKN CHLRAPRP APPL 26ML -- CONVERT TO ITEM 371833

## (undated) DEVICE — 3M™ COBAN™ NL STERILE NON-LATEX SELF-ADHERENT WRAP, 2084S, 4 IN X 5 YD (10 CM X 4,5 M), 18 ROLLS/CASE: Brand: 3M™ COBAN™

## (undated) DEVICE — TUBING SUCT L9FT FOR AUTOFUSE INFLTR SYS

## (undated) DEVICE — SUTURE VCRL SZ 3-0 L27IN ABSRB UD L26MM SH 1/2 CIR J416H

## (undated) DEVICE — SUT PROL 5-0 18IN P3 BLU --

## (undated) DEVICE — SUTURE VCRL SZ 2-0 L27IN ABSRB UD L26MM SH 1/2 CIR J417H

## (undated) DEVICE — PREP CHLORAPREP 10.5 ML ORG --

## (undated) DEVICE — SUT PROL 0 30IN CT1 BLU --

## (undated) DEVICE — INFECTION CONTROL KIT SYS

## (undated) DEVICE — 3M™ BAIR HUGGER® UNDERBODY BLANKET, FULL ACCESS, 10 PER CASE 63500: Brand: BAIR HUGGER™